# Patient Record
Sex: FEMALE | Race: WHITE | NOT HISPANIC OR LATINO | Employment: FULL TIME | URBAN - METROPOLITAN AREA
[De-identification: names, ages, dates, MRNs, and addresses within clinical notes are randomized per-mention and may not be internally consistent; named-entity substitution may affect disease eponyms.]

---

## 2023-06-12 PROBLEM — Z80.3 FAMILY HISTORY OF BREAST CANCER: Status: ACTIVE | Noted: 2023-06-12

## 2023-06-12 PROBLEM — N60.81 SEBACEOUS CYST OF BREAST, RIGHT: Status: ACTIVE | Noted: 2023-06-12

## 2024-03-31 DIAGNOSIS — Z12.31 SCREENING MAMMOGRAM FOR HIGH-RISK PATIENT: Primary | ICD-10-CM

## 2024-04-01 ENCOUNTER — TELEPHONE (OUTPATIENT)
Dept: SURGERY | Facility: CLINIC | Age: 44
End: 2024-04-01
Payer: COMMERCIAL

## 2024-04-01 NOTE — TELEPHONE ENCOUNTER
LVM for pt mammo is scheduled for 11:15am at [facility]        ----- Message from Iram Hyatt sent at 4/1/2024 10:36 AM CDT -----  Contact: Uma Eaton needs a call back at  [phone], Regards to her mammogram she wants to know what  time is it schedule for.    Thanks  Td

## 2024-04-03 ENCOUNTER — OFFICE VISIT (OUTPATIENT)
Dept: SURGERY | Facility: CLINIC | Age: 44
End: 2024-04-03
Payer: COMMERCIAL

## 2024-04-03 DIAGNOSIS — N60.81 SEBACEOUS CYST OF BREAST, RIGHT: Primary | ICD-10-CM

## 2024-04-03 DIAGNOSIS — Z80.3 FAMILY HISTORY OF BREAST CANCER: ICD-10-CM

## 2024-04-03 PROCEDURE — 99213 OFFICE O/P EST LOW 20 MIN: CPT | Mod: S$GLB,,, | Performed by: NURSE PRACTITIONER

## 2024-04-03 PROCEDURE — 99999 PR PBB SHADOW E&M-EST. PATIENT-LVL III: CPT | Mod: PBBFAC,,, | Performed by: NURSE PRACTITIONER

## 2024-04-03 PROCEDURE — 1159F MED LIST DOCD IN RCRD: CPT | Mod: CPTII,S$GLB,, | Performed by: NURSE PRACTITIONER

## 2024-04-03 PROCEDURE — 1160F RVW MEDS BY RX/DR IN RCRD: CPT | Mod: CPTII,S$GLB,, | Performed by: NURSE PRACTITIONER

## 2024-04-03 RX ORDER — LEVOTHYROXINE SODIUM 100 UG/1
100 TABLET ORAL
COMMUNITY

## 2024-04-03 RX ORDER — ATORVASTATIN CALCIUM 80 MG/1
1 TABLET, FILM COATED ORAL NIGHTLY
COMMUNITY

## 2024-04-03 RX ORDER — BLOOD-GLUCOSE SENSOR
EACH MISCELLANEOUS
COMMUNITY

## 2024-04-03 RX ORDER — TIRZEPATIDE 12.5 MG/.5ML
INJECTION, SOLUTION SUBCUTANEOUS
COMMUNITY

## 2024-04-03 RX ORDER — EMPAGLIFLOZIN 10 MG/1
10 TABLET, FILM COATED ORAL EVERY MORNING
COMMUNITY
Start: 2024-03-05

## 2024-04-03 RX ORDER — PANTOPRAZOLE SODIUM 40 MG/1
TABLET, DELAYED RELEASE ORAL
COMMUNITY
Start: 2023-10-30

## 2024-04-03 RX ORDER — DESVENLAFAXINE SUCCINATE 50 MG/1
50 TABLET, EXTENDED RELEASE ORAL
COMMUNITY

## 2024-04-03 RX ORDER — BLOOD-GLUCOSE TRANSMITTER
EACH MISCELLANEOUS
COMMUNITY

## 2024-04-03 NOTE — PROGRESS NOTES
[facility]  Boyd Hunt MD, FACS  Aster Panda NP-C      Date of Service: 4/3/2024    Chief Complaint:   Uma Lindsay is a 43 y.o. female presenting today for her 6 month evaluation. She is due for her annual mammogram.  She reports no interval changes. She was last seen 12/21/2022.     History of Present Illness:   Mrs. Lindsay first presented on May 1, 2019 after she recently noticed a lump on her right breast. This was felt to be consistent with a sebaceous cyst. She also has a family history of breast cancer. Her DOTTY was calculated in 2019 and found to give her a 19.4% Lifetime Risk of Breast Cancer. [employer] Breast Cancer Guidelines-Based Panel with Add-on Genes Negative (2019). MD:::Yoanna Kunz MD; Laura Hameed MD.    Past Medical History:   Diagnosis Date    Acid reflux     Anxiety     Depression     Hypercholesteremia     Hypothyroid     Sebaceous cyst of breast, right 06/12/2023    Type II diabetes mellitus       Past Surgical History:   Procedure Laterality Date     Cesearan Section 9/2014       Uterine ablation 2/2019      LAVH ; still has ovaries 5/29/2019      PARTIAL HYSTERECTOMY        No current outpatient medications on file.   Review of patient's allergies indicates:  Not on File   Social History     Tobacco Use    Smoking status: Not on file    Smokeless tobacco: Not on file   Substance Use Topics    Alcohol use: Not on file      Family History   Problem Relation Age of Onset    Breast cancer Sister 25        Gene Negative    Ovarian cancer Neg Hx         Review of Systems   Integumentary:  Negative for color change, rash, mole/lesion, breast mass, breast discharge and breast tenderness.   Breast: Negative for mass and tenderness       Physical Exam   HENT:   Head: Normocephalic.   Pulmonary/Chest: Right breast exhibits no inverted nipple, no mass, no nipple discharge, no skin change and no tenderness. Left breast exhibits no inverted  nipple, no mass, no nipple discharge, no skin change and no tenderness. No breast swelling.   Genitourinary: No breast swelling.   Musculoskeletal: Lymphadenopathy:      Upper Body:      Right upper body: No supraclavicular or axillary adenopathy.      Left upper body: No supraclavicular or axillary adenopathy.     Neurological: She is alert.        MAMMOGRAM REPORT: Her films are stable based on my review.  As this was done as a screening exam, her films will be reviewed by the Radiologist and compared to her previous films.  Her report will usually be received within 24 hours.  Once received and reviewed - I will phone her with any additional recommendations as needed.      ASSESSMENT and PLAN OF CARE     1. Sebaceous cyst of breast, right  Assessment & Plan:  We discussed the natural course of sebaceous cysts.  She knows that even if removed - the cyst can recur.  This risk is lower if removed when not inflamed/infected.  She knows that she can elect to have this area excised at her convenience.  We discussed the need to drain this area from time to time if it becomes infected.        2. Family history of breast cancer  Assessment & Plan:  We discussed her family history and how it could impact her own future risks.  We discussed family vs. genetic history and the importance and implications of each.  All questions answered to her satisfaction.  She knows that as additional family members are diagnosed - she will need to let us know as this may change follow up and imaging recommendations. She will continue to be followed in our [facility].    We had a discussion concerning Breast Cancer Risk Reduction and current NCCN Guidelines. She knows that her risk can be lowered slightly with a healthy lifestyle and minimal ETOH use. Being physically active will also help. She should reduce or stay away from OCPs and HRT as possible.         Medical Decision Making: It is my impression that this patient suffers all conditions  contained in this medical document.  Each of these conditions did affect our plan of care and my medical decision making today.  It is my opinion that the medical decision making concerning this patient was of minimal  difficulty based on the aforementioned conditions.  Any further recommendations will be communicated to the patient by me.  I have reviewed and verified her allergies, list of medications, medical and surgical histories, social history, and a pertinent review of symptoms.      Follow up:  6 months and prn    For:  Ultrasound with Dr. Hunt    Addendum 4/8/2024 1528: Mammogram- FINDINGS: The breast tissue is predominantly fatty. There are no suspicious masses or suspicious calcifications seen to suggest malignancy.   IMPRESSION:No evidence of malignancy. No significant change when compared to previous exam.

## 2024-04-03 NOTE — ASSESSMENT & PLAN NOTE
We discussed her family history and how it could impact her own future risks.  We discussed family vs. genetic history and the importance and implications of each.  All questions answered to her satisfaction.  She knows that as additional family members are diagnosed - she will need to let us know as this may change follow up and imaging recommendations. She will continue to be followed in our [facility].    We had a discussion concerning Breast Cancer Risk Reduction and current NCCN Guidelines. She knows that her risk can be lowered slightly with a healthy lifestyle and minimal ETOH use. Being physically active will also help. She should reduce or stay away from OCPs and HRT as possible.

## 2024-09-17 NOTE — PROGRESS NOTES
Date of Service: 10/8/2024    Chief Complaint:   Uma Lindsay is a 44 y.o. female presenting today for her 6-month evaluation. She is due for an ultrasound.  She reports no interval changes.     History of Present Illness:   Mrs. Lindsay first presented on May 1, 2019 after she recently noticed a lump on her right breast. This was felt to be consistent with a sebaceous cyst. She also has a family history of breast cancer. Her DOTTY was calculated in 2019 and found to give her a 19.4% Lifetime Risk of Breast Cancer. [employer] Breast Cancer Guidelines-Based Panel with Add-on Genes Negative (2019). MD:::Yoanna Kunz MD; Laura Hameed MD.    Past Medical History:   Diagnosis Date    Acid reflux     Anxiety     Depression     Hypercholesteremia     Hypothyroid     Sebaceous cyst of breast, right 06/12/2023    Type II diabetes mellitus       Past Surgical History:   Procedure Laterality Date     Cesearan Section 9/2014       Uterine ablation 2/2019      LAVH ; still has ovaries 5/29/2019      PARTIAL HYSTERECTOMY          Current Outpatient Medications:     atorvastatin (LIPITOR) 80 MG tablet, Take 1 tablet by mouth every evening., Disp: , Rfl:     desvenlafaxine succinate (PRISTIQ) 50 MG Tb24, Take 50 mg by mouth., Disp: , Rfl:     DEXCOM G6 SENSOR Keila, Apply topically., Disp: , Rfl:     DEXCOM G6 TRANSMITTER Keila, USE AS DIRECTED ACCORDING TO PACKAGE, Disp: , Rfl:     ergocalciferol (VITAMIN D2) 50,000 unit Cap, Take 50,000 Units by mouth every 7 days., Disp: , Rfl:     JARDIANCE 10 mg tablet, Take 10 mg by mouth every morning., Disp: , Rfl:     levothyroxine (SYNTHROID) 100 MCG tablet, Take 100 mcg by mouth., Disp: , Rfl:     MOUNJARO 12.5 mg/0.5 mL PnIj, Inject into the skin., Disp: , Rfl:     pantoprazole (PROTONIX) 40 MG tablet, TAKE 1 TABLET BY MOUTH AT BEDTIME NIGHTLY AS NEEDED, Disp: , Rfl:    Review of patient's allergies indicates:   Allergen Reactions    Sulfamethoxazole-trimethoprim Shortness Of  Breath     bacterium      Social History     Tobacco Use    Smoking status: Never    Smokeless tobacco: Never   Substance Use Topics    Alcohol use: Not on file      Family History   Problem Relation Name Age of Onset    Stroke Father      Diabetes Father      Heart disease Father      Diabetes Mother      Stroke Mother      Breast cancer Sister  25        Gene Negative    Ovarian cancer Neg Hx          Review of Systems   Integumentary:  Negative for color change, rash, mole/lesion, thickening/swelling, dimpling of skin, drainage  Breast: Negative for mass and tenderness     Physical Exam   Constitutional: She appears well-developed. She is cooperative.   HENT: Normocephalic.   Cardiovascular:  Normal rate and regular rhythm.            Pulmonary/Chest: She exhibits no tenderness and no bony tenderness.   Abdominal: Soft. Normal appearance.   Musculoskeletal: Intact with no deficits  Neurological: She is alert.   Skin: No rash noted.   Breast and Chest Wall Evaluation:   Right breast exhibits no mass, no nipple discharge, no skin change and no tenderness.     Left breast exhibits no mass, no nipple discharge, no skin change and no tenderness.     Lymphadenopathy: No supraclavicular or axillary adenopathy.    ULTRASOUND EVALUATION and REPORT    Bilateral real-time Ultrasound was performed by me.  All four quadrants of the breast, the subareolar and axillary basins were scanned.    She has some heterogeneous dense fibroglandular tissue bilaterally.    Right Breast: She has normal tissues in the right breast; there's no disruption of the tissue planes and no abnormal shadowing; she has normal skin thickness with no subcutaneous nodules of skin thickening; NEM     Left Breast: She has normal tissues in the left breast; there's no disruption of the tissue planes and no abnormal shadowing; she has normal skin thickness with no subcutaneous nodules or skin thickening; NEM     Axillae: There are no abnormal lymph nodes  seen bilaterally.     Impression: Some dense but normal tissue bilaterally with no solid/suspicious masses noted. No LAD in bilateral axillae.  BIRADS: Category 2 - Benign Finding.    Findings were discussed with patient in real time and a hand written report was given to her at the conclusion of the exam.      ASSESSMENT and PLAN OF CARE     1. Family history of breast cancer  Assessment & Plan:  We discussed her family history and how it could impact her own future risks.  We discussed family vs. genetic history and the importance and implications of each.  All questions answered to her satisfaction.  She knows that as additional family members are diagnosed - she will need to let us know as this may change follow up and imaging recommendations. She will continue to be followed in our [facility].    We had a discussion concerning Breast Cancer Risk Reduction and current NCCN Guidelines. She knows that her risk can be lowered slightly with a healthy lifestyle and minimal ETOH use. Being physically active will also help. She should reduce or stay away from OCPs and HRT as possible.     We reviewed our findings today and her questions were answered.  She understands that her imaging and exams have remained stable (and show nothing concerning).  She is comfortable being followed in a conservative fashion.      She understands the importance of monthly self-breast examination and knows to report any and all changes as they occur.    NOTE:::We viewed her films together at today's visit.  We discussed the multiple views obtained and the important findings.  Even benign changes were mentioned and her questions were answered.  She is to contact us if she has questions.        Medical Decision Making: It is my impression that the patient suffers from all the listed conditions.  Each of these conditions did affect my Plan of Care and all medical decisions that were rendered.  The medical decision making was of high difficulty based  on her co-morbidities and her previous diagnosis of being a High-Risk Patient given her personal and family histories.   I have performed and reviewed all imaging and it has been discussed with her. I have reviewed and verified her allergies, list of medications, medical and surgical histories, social history, and a pertinent review of symptoms.    Follow up: 6 months and prn     For:  Physical Examination and MGM (S) at [facility]

## 2024-09-18 NOTE — ASSESSMENT & PLAN NOTE
We discussed her family history and how it could impact her own future risks.  We discussed family vs. genetic history and the importance and implications of each.  All questions answered to her satisfaction.  She knows that as additional family members are diagnosed - she will need to let us know as this may change follow up and imaging recommendations. She will continue to be followed in our C.    We had a discussion concerning Breast Cancer Risk Reduction and current NCCN Guidelines. She knows that her risk can be lowered slightly with a healthy lifestyle and minimal ETOH use. Being physically active will also help. She should reduce or stay away from OCPs and HRT as possible.     We reviewed our findings today and her questions were answered.  She understands that her imaging and exams have remained stable (and show nothing concerning).  She is comfortable being followed in a conservative fashion.      She understands the importance of monthly self-breast examination and knows to report any and all changes as they occur.    NOTE:::We viewed her films together at today's visit.  We discussed the multiple views obtained and the important findings.  Even benign changes were mentioned and her questions were answered.  She is to contact us if she has questions.

## 2024-10-08 ENCOUNTER — OFFICE VISIT (OUTPATIENT)
Dept: SURGERY | Facility: CLINIC | Age: 44
End: 2024-10-08
Payer: COMMERCIAL

## 2024-10-08 DIAGNOSIS — Z80.3 FAMILY HISTORY OF BREAST CANCER: Primary | ICD-10-CM

## 2024-10-08 PROCEDURE — 3044F HG A1C LEVEL LT 7.0%: CPT | Mod: CPTII,S$GLB,, | Performed by: SPECIALIST

## 2024-10-08 PROCEDURE — 99214 OFFICE O/P EST MOD 30 MIN: CPT | Mod: S$GLB,,, | Performed by: SPECIALIST

## 2024-10-08 PROCEDURE — 99999 PR PBB SHADOW E&M-EST. PATIENT-LVL III: CPT | Mod: PBBFAC,,, | Performed by: SPECIALIST

## 2024-10-08 PROCEDURE — 1159F MED LIST DOCD IN RCRD: CPT | Mod: CPTII,S$GLB,, | Performed by: SPECIALIST

## 2025-03-21 ENCOUNTER — TELEPHONE (OUTPATIENT)
Dept: SURGERY | Facility: CLINIC | Age: 45
End: 2025-03-21
Payer: COMMERCIAL

## 2025-03-21 NOTE — TELEPHONE ENCOUNTER
Left pt voicemail to inform her that her future visit with Aster Panda NP will be cancelled due to her leaving the [facility] as of 3/13/25.     If she would like to be rescheduled with one of our current [facility] providers, Dr. Fran Hunter & Dr. Vic Razo are our two general surgeons that are accepting new patients as well as their PA Sharona Valera here at the [facility] location. If she would like to reschedule specifically with Aster Panda NP the number for rescheduling is [phone]. If she would like a breast specialist referral, she can discuss her specific options directly with her GYN and/or PCP.     She can request her medical records with [facility] at [phone]. Should she have any questions or concerns regarding this information, She can feel free to reach out to us at [phone].